# Patient Record
Sex: FEMALE | Race: WHITE | Employment: FULL TIME | URBAN - METROPOLITAN AREA
[De-identification: names, ages, dates, MRNs, and addresses within clinical notes are randomized per-mention and may not be internally consistent; named-entity substitution may affect disease eponyms.]

---

## 2019-04-17 ENCOUNTER — TELEPHONE (OUTPATIENT)
Dept: FAMILY MEDICINE CLINIC | Facility: CLINIC | Age: 29
End: 2019-04-17

## 2019-04-17 NOTE — TELEPHONE ENCOUNTER
----- Message from Kandi Litten, RN sent at 4/17/2019 12:22 PM CDT -----  Regarding: Care gap  Needs physical and pap

## (undated) NOTE — LETTER
4/17/2019        Halina Kansasyesenia Cook [id number]      Dear Julio C Marshall. To help us provide the highest quality medical care, [facility] uses a sophisticated computer system to track our patient records.  During